# Patient Record
Sex: FEMALE | Race: WHITE | NOT HISPANIC OR LATINO | Employment: OTHER
[De-identification: names, ages, dates, MRNs, and addresses within clinical notes are randomized per-mention and may not be internally consistent; named-entity substitution may affect disease eponyms.]

---

## 2023-03-13 ENCOUNTER — TRANSCRIBE ORDERS (OUTPATIENT)
Dept: OTHER | Age: 73
End: 2023-03-13

## 2023-03-13 DIAGNOSIS — M72.2 PLANTAR FASCIITIS: Primary | ICD-10-CM

## 2023-03-20 ENCOUNTER — THERAPY VISIT (OUTPATIENT)
Dept: PHYSICAL THERAPY | Facility: CLINIC | Age: 73
End: 2023-03-20
Payer: COMMERCIAL

## 2023-03-20 DIAGNOSIS — M72.2 PLANTAR FASCIITIS OF RIGHT FOOT: ICD-10-CM

## 2023-03-20 PROCEDURE — 97035 APP MDLTY 1+ULTRASOUND EA 15: CPT | Mod: GP | Performed by: PHYSICAL THERAPIST

## 2023-03-20 PROCEDURE — 97161 PT EVAL LOW COMPLEX 20 MIN: CPT | Mod: GP | Performed by: PHYSICAL THERAPIST

## 2023-03-20 PROCEDURE — 97140 MANUAL THERAPY 1/> REGIONS: CPT | Mod: GP | Performed by: PHYSICAL THERAPIST

## 2023-03-20 NOTE — PROGRESS NOTES
Physical Therapy Initial Evaluation  Subjective:  Pt is also doing yoga once/week.  She has tried a couple of different inserts, but has found that the Dr. Roger's inserts work the best.  She has been doing plantar fasciitis stretches, rolling an iced can under her foot, rolling a ball under her foot, and calf stretches.    The history is provided by the patient. No  was used.   Therapist Generated HPI Evaluation         Type of problem:  Right foot.    This is a new condition.  Condition occurred with:  Insidious onset.  Where condition occurred: for unknown reasons.  Patient reports pain:  Medial calcaneal tuberosity.  Pain is described as sharp and is intermittent.  Pain timing: worse with 1st step after prolonged sitting and also with prolonged walking.  Since onset symptoms are gradually improving.  Associated symptoms:  Loss of motion/stiffness. Symptoms are exacerbated by walking (1st step after prolonged sitting/sleeping.  Also increased pain with prolonged walking.)  and relieved by analgesics.      Barriers include:  None as reported by patient.    Patient Health History  Destinee Brown being seen for PT for plantar fasciitis - R heel is bothering her most.     Problem began: 12/20/2022.   Problem occurred: insiduous   Pain is reported as 4/10 on pain scale.  General health as reported by patient is good.  Pertinent medical history includes: other. Other medical history details: neuropathy in feet.            Current medications: pain med for 30 days - 1/2 ibuprofen and 1/2 something else - she's not sure what it is.    Current occupation is retired educator.                                       Objective:  System    Ankle/Foot Evaluation  ROM:    AROM:    Dorsiflexion:  Left:   15  Right:   15  Plantarflexion:  Left:  60    Right:  55  Inversion:  Left:  40     Right:  40  Eversion:  20     Right:  20        Strength:    Dorsiflexion:  Left: 5/5     Pain:   Right: 5/5    Pain:  Plantarflexion: Left: 5/5   Pain:   Right: 5/5  Pain:          Anterior Tibialis:Left: 5/5  Pain:  Right: 5/5  Pain:  Posterior Tibialis: Left: 5/5  Pain:  Right: 5/5  Pain:  Peroneals: Left: 4-/5  Pain:  Right: 5/5  Pain:  Extensor Digitorum: Left: 5/5  Pain:  Strength wnl ankle: MMT B hip IR 5/5, B hip ER 5/5, R hip abd 4+/5.      PALPATION:     Right ankle tenderness present at:   medial calcaneal                                                        General     ROS    Assessment/Plan:    Patient is a 72 year old female with right side foot complaints.    Patient has the following significant findings with corresponding treatment plan.                Diagnosis 1:  R heel pain secondary to plantar fasciitis  Pain -  hot/cold therapy, US and manual therapy  Decreased ROM/flexibility - manual therapy, therapeutic exercise and home program  Inflammation - cold therapy and US  Decreased function - therapeutic activities and home program    Therapy Evaluation Codes:   Cumulative Therapy Evaluation is: Low complexity.    Previous and current functional limitations:  (See Goal Flow Sheet for this information)    Short term and Long term goals: (See Goal Flow Sheet for this information)     Communication ability:  Patient appears to be able to clearly communicate and understand verbal and written communication and follow directions correctly.  Treatment Explanation - The following has been discussed with the patient:   RX ordered/plan of care  Anticipated outcomes  Possible risks and side effects  This patient would benefit from PT intervention to resume normal activities.   Rehab potential is good.    Frequency:  1 X week, once daily  Duration:  for 6 weeks  Discharge Plan:  Achieve all LTG.  Independent in home treatment program.  Reach maximal therapeutic benefit.    Please refer to the daily flowsheet for treatment today, total treatment time and time spent performing 1:1 timed codes.

## 2023-03-21 NOTE — PROGRESS NOTES
[facility]    OUTPATIENT Physical Therapy ORTHOPEDIC EVALUATION  PLAN OF TREATMENT FOR OUTPATIENT REHABILITATION  (COMPLETE FOR INITIAL CLAIMS ONLY)  Patient's Last Name, First Name, M.I.  YOB: 1950  Destinee Brown    Provider s Name:  [facility]   Medical Record No.  [id number]   Start of Care Date:  03/20/23   Onset Date:   12/20/22   Treatment Diagnosis:  R plantar fasciitis Medical Diagnosis:  Plantar fasciitis of right foot       Goals:     03/20/23 0500   Body Part   Goals listed below are for R plantar fasciitis   Goal #1   Goal #1 ambulation   Previous Functional Level No restrictions   Current Functional Level Minutes patient can walk   Performance Level first step after prolonged sitting/sleeping, 5/10 concordant R heel pain   STG Target Performance Minutes patient will be able to walk   Performance Level first step after prolonged sitting/sleeping, 3/10 concordant R heel pain   Rationale for safe household ambulation;for safe outdoor household ambulation;for safe community ambulation;to maintain proper body mechanics/posture while ambulating to avoid additional compensatory injury due to improper gait mechanics;to promote a healthy and active lifestyle   Due Date 04/10/23    LTG Target Performance Minutes patient will be able to  walk   Performance Level first step after prolonged sitting/sleeping, 1-2/10 concordant R heel pain   Rationale for safe household ambulation;for safe outdoor household ambulation;for safe community ambulation;to maintain proper body mechanics/posture while ambulating to avoid additional compensatory injury due to improper gait mechanics;to promote a healthy and active lifestyle   Due Date 05/01/23       Therapy Frequency:  1x/week  Predicted Duration of Therapy Intervention:  6 weeks    Leilani Bedoya, PT                 I  CERTIFY THE NEED FOR THESE SERVICES FURNISHED UNDER        THIS PLAN OF TREATMENT AND WHILE UNDER MY CARE     (Physician attestation of this document indicates review and certification of the therapy plan).                     Certification Date From:  03/20/23   Certification Date To:  06/17/23    Referring Provider:  Saul Haq    Initial Assessment        See Epic Evaluation SOC Date: 03/20/23

## 2023-03-21 NOTE — PROGRESS NOTES
[facility]    OUTPATIENT Physical Therapy ORTHOPEDIC EVALUATION  PLAN OF TREATMENT FOR OUTPATIENT REHABILITATION  (COMPLETE FOR INITIAL CLAIMS ONLY)  Patient's Last Name, First Name, M.I.  YOB: 1950  Destinee Brown    Provider s Name:  [facility]   Medical Record No.  [id number]   Start of Care Date:  03/20/23   Onset Date:   12/20/22   Treatment Diagnosis:  R plantar fasciitis Medical Diagnosis:  Plantar fasciitis of right foot       Goals:     03/20/23 0500   Body Part   Goals listed below are for R plantar fasciitis   Goal #1   Goal #1 ambulation   Previous Functional Level No restrictions   Current Functional Level Minutes patient can walk   Performance Level first step after prolonged sitting/sleeping, 5/10 concordant R heel pain   STG Target Performance Minutes patient will be able to walk   Performance Level first step after prolonged sitting/sleeping, 3/10 concordant R heel pain   Rationale for safe household ambulation;for safe outdoor household ambulation;for safe community ambulation;to maintain proper body mechanics/posture while ambulating to avoid additional compensatory injury due to improper gait mechanics;to promote a healthy and active lifestyle   Due Date 04/10/23    LTG Target Performance Minutes patient will be able to  walk   Performance Level first step after prolonged sitting/sleeping, 1-2/10 concordant R heel pain   Rationale for safe household ambulation;for safe outdoor household ambulation;for safe community ambulation;to maintain proper body mechanics/posture while ambulating to avoid additional compensatory injury due to improper gait mechanics;to promote a healthy and active lifestyle   Due Date 05/01/23         Therapy Frequency:  1x/week  Predicted Duration of Therapy Intervention:  6 weeks    Leilani Bedoya, PT                 I  CERTIFY THE NEED FOR THESE SERVICES FURNISHED UNDER        THIS PLAN OF TREATMENT AND WHILE UNDER MY CARE .             Physician Signature               Date    X_____________________________________________________                         Certification Date From:  03/20/23   Certification Date To:  06/17/23    Referring Provider:  Saul Haq    Initial Assessment        See Epic Evaluation SOC Date: 03/20/23

## 2023-03-27 ENCOUNTER — THERAPY VISIT (OUTPATIENT)
Dept: PHYSICAL THERAPY | Facility: CLINIC | Age: 73
End: 2023-03-27
Payer: COMMERCIAL

## 2023-03-27 DIAGNOSIS — M72.2 PLANTAR FASCIITIS OF RIGHT FOOT: Primary | ICD-10-CM

## 2023-03-27 PROCEDURE — 97110 THERAPEUTIC EXERCISES: CPT | Mod: GP | Performed by: PHYSICAL THERAPIST

## 2023-03-27 PROCEDURE — 97140 MANUAL THERAPY 1/> REGIONS: CPT | Mod: GP | Performed by: PHYSICAL THERAPIST

## 2023-04-03 ENCOUNTER — THERAPY VISIT (OUTPATIENT)
Dept: PHYSICAL THERAPY | Facility: CLINIC | Age: 73
End: 2023-04-03
Payer: COMMERCIAL

## 2023-04-03 DIAGNOSIS — M72.2 PLANTAR FASCIITIS OF RIGHT FOOT: Primary | ICD-10-CM

## 2023-04-03 PROCEDURE — 97140 MANUAL THERAPY 1/> REGIONS: CPT | Mod: GP | Performed by: PHYSICAL THERAPIST

## 2023-04-03 PROCEDURE — 97035 APP MDLTY 1+ULTRASOUND EA 15: CPT | Mod: GP | Performed by: PHYSICAL THERAPIST

## 2023-05-09 NOTE — PROGRESS NOTES
DISCHARGE REPORT    Progress reporting period is from 3/20/2023 to 4/3/2023.       SUBJECTIVE  Subjective changes noted by patient:  Pt was seen 3 times for PT and has not returned for any additional follow-up visits since 4/3/2023.  Current subjective changes are unknown.        OBJECTIVE  Changes noted in objective findings:  Patient has failed to return to therapy so current objective findings are unknown.        ASSESSMENT/PLAN  Assessment of Progress: The patient has not returned to therapy. Current status is unknown.  Self Management Plans:  Patient has been instructed in a home treatment program.    Recommendations:  Pt will be discharged from PT.